# Patient Record
Sex: MALE | Race: WHITE | Employment: UNEMPLOYED | URBAN - NONMETROPOLITAN AREA
[De-identification: names, ages, dates, MRNs, and addresses within clinical notes are randomized per-mention and may not be internally consistent; named-entity substitution may affect disease eponyms.]

---
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
## 2022-03-15 ENCOUNTER — TELEPHONE (OUTPATIENT)
Dept: GASTROENTEROLOGY | Age: 22
End: 2022-03-15

## 2023-03-20 NOTE — ED NOTES
Patient been having fever/chills at home, mom treating with tylenol/motrin at home, however today complaining of his throat hurting.      [facility]  01/23/18 4023 15